# Patient Record
Sex: FEMALE | Race: WHITE | NOT HISPANIC OR LATINO | Employment: OTHER | URBAN - METROPOLITAN AREA
[De-identification: names, ages, dates, MRNs, and addresses within clinical notes are randomized per-mention and may not be internally consistent; named-entity substitution may affect disease eponyms.]

---

## 2019-10-30 PROBLEM — 414916001 OBESITY: Status: ACTIVE | Noted: 2019-10-30

## 2021-03-09 ENCOUNTER — OFFICE VISIT (OUTPATIENT)
Dept: URBAN - METROPOLITAN AREA CLINIC 27 | Facility: CLINIC | Age: 81
End: 2021-03-09

## 2021-04-19 ENCOUNTER — OFFICE VISIT (OUTPATIENT)
Dept: URBAN - METROPOLITAN AREA CLINIC 27 | Facility: CLINIC | Age: 81
End: 2021-04-19

## 2021-04-19 PROBLEM — 271840007 ABNORMAL FECES: Status: ACTIVE | Noted: 2021-04-19

## 2021-04-20 ENCOUNTER — LAB OUTSIDE AN ENCOUNTER (OUTPATIENT)
Dept: URBAN - METROPOLITAN AREA CLINIC 121 | Facility: CLINIC | Age: 81
End: 2021-04-20

## 2021-04-20 LAB
BASOPH COUNT: (no result)
BASOPHIL %: 0.5
BG RANDOM: 100
BUN/CREAT: (no result)
BUN: 15
CALCIUM: 9.8
CHLORIDE: 102
CO2: 26
CREATININE: 0.64
EOS COUNT: (no result)
EOSINOPHIL %: 0.4
HCT: 35.3
HGB: 11.7
LYMPHS %: 28.6
MCH: 29.2
MCHC: (no result)
MCV: 88
MONOCYTE %: 9
MONOSCT AUTO: (no result)
PLATELETS: (no result)
PMN %: 61.5
POTASSIUM: 4.4
RBC: (no result)
RDW: 14
WBC: (no result)
ZZ-GE-UNK: (no result)
ZZ-GE-UNK: (no result)

## 2021-05-28 ENCOUNTER — OFFICE VISIT (OUTPATIENT)
Dept: URBAN - METROPOLITAN AREA SURGERY CENTER 7 | Facility: SURGERY CENTER | Age: 81
End: 2021-05-28

## 2021-07-13 ENCOUNTER — OFFICE VISIT (OUTPATIENT)
Dept: URBAN - METROPOLITAN AREA CLINIC 27 | Facility: CLINIC | Age: 81
End: 2021-07-13

## 2021-07-13 PROBLEM — 266435005 GASTRO-ESOPHAGEAL REFLUX DISEASE WITHOUT ESOPHAGITIS: Status: ACTIVE | Noted: 2021-07-13

## 2022-04-30 ENCOUNTER — TELEPHONE ENCOUNTER (OUTPATIENT)
Dept: URBAN - METROPOLITAN AREA CLINIC 121 | Facility: CLINIC | Age: 82
End: 2022-04-30

## 2022-04-30 RX ORDER — POLYETHYLENE GLYCOL 3350 17 G/17G
AS DIRECTED BID POWDER, FOR SOLUTION ORAL
OUTPATIENT
Start: 2005-12-21

## 2022-04-30 RX ORDER — POLYETHYLENE GLYCOL 400 AND PROPYLENE GLYCOL 4; 3 MG/ML; MG/ML
SOLUTION/ DROPS OPHTHALMIC
OUTPATIENT
Start: 2008-01-15

## 2022-04-30 RX ORDER — POLYETHYLENE GLYCOL 3350 17 G/17G
AS NEEDED POWDER, FOR SOLUTION ORAL
OUTPATIENT
Start: 2014-07-15

## 2022-04-30 RX ORDER — POLYETHYLENE GLYCOL 3350 17 G/17G
AS NEEDED POWDER, FOR SOLUTION ORAL
OUTPATIENT
Start: 2014-07-15 | End: 2021-03-09

## 2022-04-30 RX ORDER — POLYETHYLENE GLYCOL 400 AND PROPYLENE GLYCOL 4; 3 MG/ML; MG/ML
SOLUTION/ DROPS OPHTHALMIC
OUTPATIENT
Start: 2008-01-15 | End: 2014-07-15

## 2022-04-30 RX ORDER — MULTIVIT-MIN/FOLIC ACID/LUTEIN 400-250MCG
TABLET,CHEWABLE ORAL
OUTPATIENT
Start: 2008-01-15

## 2022-04-30 RX ORDER — RALOXIFENE HCL 60 MG
TABLET ORAL
OUTPATIENT
Start: 2008-01-15

## 2022-04-30 RX ORDER — RALOXIFENE HCL 60 MG
QD TABLET ORAL
OUTPATIENT
Start: 2014-07-15

## 2022-04-30 RX ORDER — RALOXIFENE HCL 60 MG
QD TABLET ORAL
OUTPATIENT
Start: 2014-07-15 | End: 2021-03-09

## 2022-05-01 ENCOUNTER — TELEPHONE ENCOUNTER (OUTPATIENT)
Dept: URBAN - METROPOLITAN AREA CLINIC 121 | Facility: CLINIC | Age: 82
End: 2022-05-01

## 2022-05-01 RX ORDER — MULTIVIT-MIN/FOLIC/VIT K/LYCOP 400-300MCG
TABLET ORAL
Status: ACTIVE | COMMUNITY
Start: 2014-08-26

## 2022-05-01 RX ORDER — CARBOXYMETHYLCELLULOSE SODIUM 5 MG/ML
SOLUTION/ DROPS OPHTHALMIC
Status: ACTIVE | COMMUNITY
Start: 2014-08-26

## 2022-05-01 RX ORDER — MULTIVIT-MIN/FOLIC ACID/LUTEIN 400-250MCG
QD TABLET,CHEWABLE ORAL
Status: ACTIVE | COMMUNITY
Start: 2014-07-15

## 2022-05-01 RX ORDER — LANSOPRAZOLE 15 MG/1
CAPSULE, DELAYED RELEASE ORAL
Status: ACTIVE | COMMUNITY
Start: 2021-03-09

## 2022-05-01 RX ORDER — FAMOTIDINE 20 MG
Q HS TABLET ORAL
Status: ACTIVE | COMMUNITY
Start: 2014-07-15

## 2024-04-24 ENCOUNTER — OV EP (OUTPATIENT)
Dept: URBAN - METROPOLITAN AREA CLINIC 27 | Facility: CLINIC | Age: 84
End: 2024-04-24
Payer: MEDICARE

## 2024-04-24 VITALS
SYSTOLIC BLOOD PRESSURE: 126 MMHG | HEIGHT: 55 IN | WEIGHT: 126 LBS | DIASTOLIC BLOOD PRESSURE: 85 MMHG | HEART RATE: 65 BPM | BODY MASS INDEX: 29.16 KG/M2

## 2024-04-24 DIAGNOSIS — K62.5 RECTAL BLEEDING: ICD-10-CM

## 2024-04-24 DIAGNOSIS — E87.1 CHRONIC HYPONATREMIA: ICD-10-CM

## 2024-04-24 PROBLEM — 12063002: Status: ACTIVE | Noted: 2024-04-24

## 2024-04-24 PROBLEM — 50327002: Status: ACTIVE | Noted: 2024-04-24

## 2024-04-24 PROCEDURE — 99214 OFFICE O/P EST MOD 30 MIN: CPT | Performed by: PHYSICIAN ASSISTANT

## 2024-04-24 RX ORDER — MULTIVIT-MIN/FOLIC ACID/LUTEIN 400-250MCG
QD TABLET,CHEWABLE ORAL
Status: ACTIVE | COMMUNITY
Start: 2014-07-15

## 2024-04-24 RX ORDER — FAMOTIDINE 20 MG/1
Q HS TABLET, FILM COATED ORAL
Status: ACTIVE | COMMUNITY
Start: 2014-07-15

## 2024-04-24 RX ORDER — MULTIVIT-MIN/FOLIC/VIT K/LYCOP 400-300MCG
TABLET ORAL
Status: ACTIVE | COMMUNITY
Start: 2014-08-26

## 2024-04-24 RX ORDER — CARBOXYMETHYLCELLULOSE SODIUM 5 MG/ML
SOLUTION/ DROPS OPHTHALMIC
Status: ACTIVE | COMMUNITY
Start: 2014-08-26

## 2024-04-24 RX ORDER — LANSOPRAZOLE 15 MG/1
CAPSULE, DELAYED RELEASE ORAL
Status: ACTIVE | COMMUNITY
Start: 2021-03-09

## 2024-04-24 NOTE — HPI-ZZZTODAY'S VISIT
Ms. Marlow is an 83-year-old female patient of Dr. Sandoval here for evaluation of rectal bleeding. Several months ago she noticed red tinged stool x several days; last week she had an episode of red blood with wiping, also some perianal burning. This is atypical for her; she has not had rectal bleeding in many decades. She has IBS, does not recall if this bleeding occurred in the setting of constipation. No significant change in bowel habits or abdominal pain. No weight loss. She has h/o chronic hyponatremia for which she was hospitalized several years ago, says it has been stable since then w baseline 134, however has not had labs since last summer. She takes medication for HTN, is not on ASA or blood thinners . Colonoscopy 2021:Normal  . FH: no CRC or polyps

## 2024-05-28 ENCOUNTER — TELEPHONE ENCOUNTER (OUTPATIENT)
Dept: URBAN - METROPOLITAN AREA CLINIC 27 | Facility: CLINIC | Age: 84
End: 2024-05-28

## 2024-06-07 ENCOUNTER — CLAIMS CREATED FROM THE CLAIM WINDOW (OUTPATIENT)
Dept: URBAN - METROPOLITAN AREA SURGERY CENTER 7 | Facility: SURGERY CENTER | Age: 84
End: 2024-06-07
Payer: MEDICARE

## 2024-06-07 ENCOUNTER — CLAIMS CREATED FROM THE CLAIM WINDOW (OUTPATIENT)
Dept: URBAN - METROPOLITAN AREA CLINIC 4 | Facility: CLINIC | Age: 84
End: 2024-06-07
Payer: MEDICARE

## 2024-06-07 ENCOUNTER — OFFICE VISIT (OUTPATIENT)
Dept: URBAN - METROPOLITAN AREA SURGERY CENTER 7 | Facility: SURGERY CENTER | Age: 84
End: 2024-06-07

## 2024-06-07 DIAGNOSIS — K62.5 ANAL BLEEDING: ICD-10-CM

## 2024-06-07 DIAGNOSIS — D12.3 ADENOMATOUS POLYP OF TRANSVERSE COLON: ICD-10-CM

## 2024-06-07 DIAGNOSIS — K21.9 GASTRIC REFLUX: ICD-10-CM

## 2024-06-07 DIAGNOSIS — K63.89 OTHER SPECIFIED DISEASES OF INTESTINE: ICD-10-CM

## 2024-06-07 DIAGNOSIS — K92.1 HEMATOCHEZIA: ICD-10-CM

## 2024-06-07 PROCEDURE — 88305 TISSUE EXAM BY PATHOLOGIST: CPT | Performed by: PATHOLOGY

## 2024-06-07 PROCEDURE — 00811 ANES LWR INTST NDSC NOS: CPT | Performed by: NURSE ANESTHETIST, CERTIFIED REGISTERED

## 2024-06-07 PROCEDURE — 45380 COLONOSCOPY AND BIOPSY: CPT | Performed by: INTERNAL MEDICINE

## 2024-06-07 PROCEDURE — 45380 COLONOSCOPY AND BIOPSY: CPT | Performed by: CLINIC/CENTER

## 2024-06-07 PROCEDURE — G8907 PT DOC NO EVENTS ON DISCHARG: HCPCS | Performed by: CLINIC/CENTER

## 2024-06-07 RX ORDER — MULTIVIT-MIN/FOLIC/VIT K/LYCOP 400-300MCG
TABLET ORAL
Status: ACTIVE | COMMUNITY
Start: 2014-08-26

## 2024-06-07 RX ORDER — FAMOTIDINE 20 MG/1
Q HS TABLET, FILM COATED ORAL
Status: ACTIVE | COMMUNITY
Start: 2014-07-15

## 2024-06-07 RX ORDER — LANSOPRAZOLE 15 MG/1
CAPSULE, DELAYED RELEASE ORAL
Status: ACTIVE | COMMUNITY
Start: 2021-03-09

## 2024-06-07 RX ORDER — MULTIVIT-MIN/FOLIC ACID/LUTEIN 400-250MCG
QD TABLET,CHEWABLE ORAL
Status: ACTIVE | COMMUNITY
Start: 2014-07-15

## 2024-06-07 RX ORDER — CARBOXYMETHYLCELLULOSE SODIUM 5 MG/ML
SOLUTION/ DROPS OPHTHALMIC
Status: ACTIVE | COMMUNITY
Start: 2014-08-26